# Patient Record
Sex: MALE | Race: WHITE | NOT HISPANIC OR LATINO | URBAN - METROPOLITAN AREA
[De-identification: names, ages, dates, MRNs, and addresses within clinical notes are randomized per-mention and may not be internally consistent; named-entity substitution may affect disease eponyms.]

---

## 2021-01-13 ENCOUNTER — OFFICE VISIT CONVERTED (OUTPATIENT)
Dept: GASTROENTEROLOGY | Facility: CLINIC | Age: 21
End: 2021-01-13
Attending: NURSE PRACTITIONER

## 2021-02-24 ENCOUNTER — HOSPITAL ENCOUNTER (OUTPATIENT)
Dept: PREADMISSION TESTING | Facility: HOSPITAL | Age: 21
Discharge: HOME | End: 2021-02-24
Attending: INTERNAL MEDICINE

## 2021-02-25 LAB — SARS-COV-2 RNA SPEC QL NAA+PROBE: NOT DETECTED

## 2021-03-01 ENCOUNTER — HOSPITAL ENCOUNTER (OUTPATIENT)
Dept: GASTROENTEROLOGY | Facility: HOSPITAL | Age: 21
Setting detail: HOSPITAL OUTPATIENT SURGERY
Discharge: HOME | End: 2021-03-01
Attending: INTERNAL MEDICINE

## 2021-05-10 NOTE — H&P
History and Physical      Patient Name: Gamal Jackson   Patient ID: [id number]   Sex: Male   YOB: 2000    Primary Care Provider: Neal Min MD   Referring Provider: Neal Min MD    Visit Date: January 13, 2021    Provider: DELFIN Saenz   Location: [facility]   Location Address: [address] [place] [address]  [id number]   Location Phone: [phone]          Chief Complaint  · Nausea  · Vomiting  · Hematemesis      History Of Present Illness  The patient is a 20 year old /White male who is referred by Neal Min MD for evaluation of a 1 year history of vomiting and rectal bleeding. He reports the vomiting occurs intermittently. The vomiting has been present for approximately 1 year. His appetite is preserved and he stops eating because of nausea. He has had several episodes of bright red blood per rectum. He reports small amounts of blood.   The patient has not identified any aggravating factors. The patient has not identified any alleviating factors. The patient does not take narcotic pain medicine and does not take medications that decrease motility. See med list: MEDICATION LIST. Patient does not report an acute viral illness prior to the onset of his symptoms. He is not a diabetic.     Prior to this initial visit, he not undergone any specific UGI testing..   The patient has not had an upper endoscopy.      The patient has been vomiting for approximately 1 year.  He reports he has a little bit of blood when he vomits each time.  The blood is red.  The vomiting does not occur all the time.  He does not report nausea with the vomiting.  He does report several episodes of rectal bleeding that start approximately a year ago.  The blood is bright red and occurs with wiping.  It occurs in small amounts.  He has 1-3 formed bowel movements per day.  He denies melena.  He does have generalized left abdominal pain that tends to occur with vomiting.  " He describes it as stabbing.  Nothing alleviates the pain.  He does not smoke.  He does not use NSAIDs.  There is no known family history of colorectal cancer.  The patient does take Adderall.    Labs 12/21/2020: Hemoglobin 14.6, hematocrit 44.3, platelets 214, AST 30, ALT 40, alk phos 73, total bili 0.3, creatinine 0.77, GFR greater than 60         Past Medical History  *No Pertinent Past Medical History         Past Surgical History  *Denies any surgical procedures         Medication List  Adderall 10 mg oral tablet         Allergy List  NO KNOWN DRUG ALLERGIES       Allergies Reconciled  Social History  Alcohol (Light); Tobacco (Never)         Review of Systems  · Constitutional  o Denies  o : chills, fever  · Eyes  o Denies  o : blurred vision, changes in vision  · Cardiovascular  o Denies  o : chest pain  · Respiratory  o Denies  o : shortness of breath  · Gastrointestinal  o Admits  o : vomiting  o Denies  o : nausea  · Genitourinary  o Denies  o : dysuria, blood in urine  · Integument  o Denies  o : rash  · Neurologic  o Denies  o : tingling or numbness  · Musculoskeletal  o Denies  o : joint pain  · Endocrine  o Denies  o : weight gain, weight loss  · Psychiatric  o Denies  o : anxiety, depression      Vitals  Date Time BP Position Site L\R Cuff Size HR RR TEMP (F) WT  HT  BMI kg/m2 BSA m2 O2 Sat FR L/min FiO2 HC       01/13/2021 08:42 /68 Sitting    56 - R 16  189lbs 0oz 5'  11\" 26.36 2.07 99 %            Physical Examination  · Constitutional  o Appearance  o : Well-nourished, well developed, alert, in no acute distress, alert and oriented X 3.  · Eyes  o Vision  o :   § Visual Fields  § : eyes move symmetrical in all directions  o Sclerae  o : sclerae anicteric  o Pupils and Irises  o : pupils equal and symetrical  · Neck  o Inspection/Palpation  o : Trachea is midline, no adenopathy  o Thyroid  o : Thyroid is not enlarged  · Respiratory  o Respiratory Effort  o : Breathing is " unlabored.  o Inspection of Chest  o : normal appearance, no retractions  o Auscultation of Lungs  o : Chest is clear to auscultation bilaterally.  · Cardiovascular  o Heart  o :   § Auscultation of Heart  § : no murmurs, rubs, or gallops  · Gastrointestinal  o Abdominal Examination  o : Abdomen is soft, nontender to palpation, with normal active bowel sounds, no appreciable hepatosplenomegaly.  · Skin and Subcutaneous Tissue  o General Inspection  o : Skin is without focal lesions. Skin turgor is normal.  · Psychiatric  o Mood and Affect  o : Mood and affect are appropriate to circumstances.          Assessment  · Vomiting     787.03/R11.10  · Left sided abdominal pain     789.09/R10.9  · Rectal bleeding     569.3/K62.5  · Hematemesis     578.0/K92.0      Plan  · Orders  o Consent for Esophagogastrodudodenoscopy (EGD) with dilatation -Possible risk/complications, benefits, and alternatives to surgical or invasive procedure have been explained to patient and/or legal gaurdian. -Patient has been evaluated and can tolerate anesthesia and/or sedation. Risk, benefits, and alternatives to anesthesia and sedation have been explained to patient and/or legal gaurdian. ([id number]) - 789.09/R10.9, 578.0/K92.0, 787.03/R11.10 - 01/13/2021  o Flexible Colonoscopy -Possible risks/complications, benefits, and alternatives to surgical or invasive procedure have been explained to patient and/or legal gaurdian. -Patient has been evaluated and can tolerate anethesia and/or sedation. Risk, benefits, and alternatives to anethesia and/or sedation have been explained to patient and/or legal gaurdian. ([id number]) - 789.09/R10.9, 569.3/K62.5 - 01/13/2021  · Medications  o Medications have been Reconciled  o Transition of Care or Provider Policy  · Instructions  o 20-year-old male presenting today for the evaluation of hematemesis and rectal bleeding. His symptoms have been present for a year and have been improving. I have recommended that he  undergo an EGD/colonoscopy to determine the etiology of his symptoms. He has been informed that Covid testing is required prior to the procedure. The patient is agreeable to proceed with both procedures.  o Electronically Identified Patient Education Materials Provided Electronically            Electronically Signed by: DELFIN Saenz -Author on January 13, 2021 09:02:51 AM  Electronically Co-signed by: Chico Roberts MD -Reviewer on January 13, 2021 02:30:13 PM

## 2021-05-14 VITALS
SYSTOLIC BLOOD PRESSURE: 129 MMHG | WEIGHT: 189 LBS | HEIGHT: 71 IN | RESPIRATION RATE: 16 BRPM | DIASTOLIC BLOOD PRESSURE: 68 MMHG | HEART RATE: 56 BPM | BODY MASS INDEX: 26.46 KG/M2 | OXYGEN SATURATION: 99 %